# Patient Record
Sex: FEMALE | Race: ASIAN | URBAN - METROPOLITAN AREA
[De-identification: names, ages, dates, MRNs, and addresses within clinical notes are randomized per-mention and may not be internally consistent; named-entity substitution may affect disease eponyms.]

---

## 2019-03-19 ENCOUNTER — NURSE ONLY (OUTPATIENT)
Dept: PRIMARY CARE CLINIC | Age: 19
End: 2019-03-19

## 2019-03-19 DIAGNOSIS — Z23 NEED FOR TUBERCULOSIS VACCINATION: Primary | ICD-10-CM

## 2019-03-19 PROCEDURE — 86580 TB INTRADERMAL TEST: CPT | Performed by: FAMILY MEDICINE

## 2019-03-21 ENCOUNTER — NURSE ONLY (OUTPATIENT)
Dept: PRIMARY CARE CLINIC | Age: 19
End: 2019-03-21

## 2019-03-21 LAB
INDURATION: NORMAL
TB SKIN TEST: NEGATIVE

## 2019-03-22 ENCOUNTER — HOSPITAL ENCOUNTER (OUTPATIENT)
Age: 19
Discharge: HOME | End: 2019-03-24
Payer: COMMERCIAL

## 2019-03-22 ENCOUNTER — OFFICE VISIT (OUTPATIENT)
Dept: PRIMARY CARE CLINIC | Age: 19
End: 2019-03-22

## 2019-03-22 VITALS
TEMPERATURE: 98.3 F | BODY MASS INDEX: 20.22 KG/M2 | OXYGEN SATURATION: 99 % | HEIGHT: 60 IN | WEIGHT: 103 LBS | HEART RATE: 63 BPM | DIASTOLIC BLOOD PRESSURE: 60 MMHG | RESPIRATION RATE: 17 BRPM | SYSTOLIC BLOOD PRESSURE: 102 MMHG

## 2019-03-22 DIAGNOSIS — Z20.2 EXPOSURE TO SEXUALLY TRANSMITTED DISEASE (STD): ICD-10-CM

## 2019-03-22 DIAGNOSIS — N93.8 DYSFUNCTIONAL UTERINE BLEEDING: ICD-10-CM

## 2019-03-22 LAB
CONTROL: NORMAL
PREGNANCY TEST URINE, POC: NORMAL

## 2019-03-22 PROCEDURE — 99202 OFFICE O/P NEW SF 15 MIN: CPT | Performed by: FAMILY MEDICINE

## 2019-03-22 PROCEDURE — 87491 CHLMYD TRACH DNA AMP PROBE: CPT

## 2019-03-22 PROCEDURE — 81025 URINE PREGNANCY TEST: CPT | Performed by: FAMILY MEDICINE

## 2019-03-22 PROCEDURE — 87591 N.GONORRHOEAE DNA AMP PROB: CPT

## 2019-03-22 RX ORDER — DOXYCYCLINE HYCLATE 100 MG
100 TABLET ORAL 2 TIMES DAILY
Qty: 14 TABLET | Refills: 0 | Status: SHIPPED | OUTPATIENT
Start: 2019-03-22 | End: 2019-03-29

## 2019-03-22 SDOH — HEALTH STABILITY: MENTAL HEALTH: HOW OFTEN DO YOU HAVE A DRINK CONTAINING ALCOHOL?: 2-4 TIMES A MONTH

## 2019-03-22 ASSESSMENT — PATIENT HEALTH QUESTIONNAIRE - PHQ9
SUM OF ALL RESPONSES TO PHQ9 QUESTIONS 1 & 2: 0
2. FEELING DOWN, DEPRESSED OR HOPELESS: 0
SUM OF ALL RESPONSES TO PHQ QUESTIONS 1-9: 0
1. LITTLE INTEREST OR PLEASURE IN DOING THINGS: 0
SUM OF ALL RESPONSES TO PHQ QUESTIONS 1-9: 0

## 2019-03-27 ENCOUNTER — TELEPHONE (OUTPATIENT)
Dept: FAMILY MEDICINE CLINIC | Age: 19
End: 2019-03-27

## 2019-03-27 LAB
N GONORRHOEAE AMPLIFIED DET: ABNORMAL
ORGANISM: ABNORMAL

## 2019-04-01 ENCOUNTER — NURSE ONLY (OUTPATIENT)
Dept: PRIMARY CARE CLINIC | Age: 19
End: 2019-04-01

## 2019-04-01 NOTE — PATIENT INSTRUCTIONS
Reviewed patients positive Chlamydia results per Dr. Latanya Elmore. Discussed importance of taking the prescribed antibiotics as instructed and scheduling a follow up visit in 1 month, patient verbalized an understanding.

## 2019-04-17 ENCOUNTER — OFFICE VISIT (OUTPATIENT)
Dept: PRIMARY CARE CLINIC | Age: 19
End: 2019-04-17

## 2019-04-17 VITALS
TEMPERATURE: 98.7 F | DIASTOLIC BLOOD PRESSURE: 62 MMHG | RESPIRATION RATE: 15 BRPM | OXYGEN SATURATION: 99 % | HEART RATE: 67 BPM | BODY MASS INDEX: 19.45 KG/M2 | WEIGHT: 103 LBS | HEIGHT: 61 IN | SYSTOLIC BLOOD PRESSURE: 97 MMHG

## 2019-04-17 DIAGNOSIS — K14.6 TONGUE PAIN: ICD-10-CM

## 2019-04-17 DIAGNOSIS — J02.9 PHARYNGITIS, UNSPECIFIED ETIOLOGY: Primary | ICD-10-CM

## 2019-04-17 LAB — S PYO AG THROAT QL: NORMAL

## 2019-04-17 PROCEDURE — 99213 OFFICE O/P EST LOW 20 MIN: CPT | Performed by: NURSE PRACTITIONER

## 2019-04-17 PROCEDURE — 87880 STREP A ASSAY W/OPTIC: CPT | Performed by: NURSE PRACTITIONER

## 2019-04-17 RX ORDER — AMOXICILLIN 500 MG/1
500 CAPSULE ORAL 2 TIMES DAILY
Qty: 20 CAPSULE | Refills: 0 | Status: SHIPPED | OUTPATIENT
Start: 2019-04-17 | End: 2019-04-27

## 2019-04-17 NOTE — PROGRESS NOTES
Chief Complaint:   Pharyngitis (Sore throat 3-4xdays )      History of Present Illness   Source of history provided by:  patient. Traci Mckinney is a 19 y.o. old female who has a past medical history of: No past medical history on file. presents to the [facility] for sore throat x 3 days. She also reports that her tongue hurts as well. Also has mild headache. Reports associated pain with swallowing, nasal congestion, rhinorrhea, body aches, and nausea. Denies any fever, chills, dyspnea, dysphagia, CP, SOB, cough, nausea, vomiting, rash, or lethargy. She has not taken any medication for her sore throat. Exposed To: Streptococcus: no.                              Infectious Mononucleosis:  no.        ROS    Unless otherwise stated in this report or unable to obtain because of the patient's clinical or mental status as evidenced by the medical record, this patients's positive and negative responses for Review of Systems, constitutional, psych, eyes, ENT, cardiovascular, respiratory, gastrointestinal, neurological, genitourinary, musculoskeletal, integument systems and systems related to the presenting problem are either stated in the preceding or were not pertinent or were negative for the symptoms and/or complaints related to the medical problem. Past Medical History:  has no past medical history on file. Past Surgical History:  has no past surgical history on file. Social History:  reports that she has never smoked. She has never used smokeless tobacco. She reports that she drinks alcohol. She reports that she does not use drugs. Family History: family history is not on file. Allergies: Patient has no known allergies.     Physical Exam         VS:  BP 97/62 (Site: Right Upper Arm, Position: Sitting)   Pulse 67   Temp 98.7 °F (37.1 °C) (Oral)   Resp 15   Ht 5' 1\" (1.549 m)   Wt 103 lb (46.7 kg)   LMP 03/25/2019 (Exact Date)   SpO2 99%   BMI 19.46 kg/m²    Oxygen Saturation Interpretation: Normal     Constitutional:  Alert, development consistent with age. .  Ears:  TMs  without perforation, injection, or bulging. External canals clear without swelling or exudate. Throat: Airway patent. Posterior pharynx with moderate erythema and moderate tonsillar hypertrophy. Purulent exudate noted. Tongue is midline - no erythema or edema noted. Neck:  Supple with full ROM. There is mild anterior bilateral adenopathy. Lungs:  Clear to auscultation and breath sounds equal.    CV: Regular rate and rhythm, normal heart sounds, without pathological murmurs, ectopy, gallops, or rubs. Skin:  No rashes, erythema present. Lymphatics: No lymphangitis or adenopathy noted other then stated above. Neurological:  Alert and orientated. Motor functions intact. Responds to commands. Lab / Imaging Results   (All laboratory and radiology results have been personally reviewed by myself)  Labs:  Results for orders placed or performed in visit on 04/17/19   POCT rapid strep A   Result Value Ref Range    Strep A Ag None Detected None Detected       Imaging: All Radiology results interpreted by Radiologist unless otherwise noted. No orders to display       Assessment / Plan     Impression(s):    1. Pharyngitis, unspecified etiology    - POCT rapid strep A  - amoxicillin (AMOXIL) 500 MG capsule; Take 1 capsule by mouth 2 times daily for 10 days  Dispense: 20 capsule; Refill: 0    2. Tongue pain    - Magic Mouthwash (MIRACLE MOUTHWASH); Swish and spit 5 mLs 4 times daily as needed for Irritation  Dispense: 240 mL; Refill: 0    Return if symptoms worsen or fail to improve. . Script written for amoxil, side effects discussed. Increase fluids and rest. NSAIDs prn pain/fever. F/u PCP in 5-7 days if symptoms persist. ED sooner if symptoms worsen or change. ED immediately with the development of refractory fever, shaking chills, dyspnea, dysphagia, neck stiffness, vomiting, etc. Pt is in agreement with this care plan. All questions answered.

## 2019-05-10 ENCOUNTER — HOSPITAL ENCOUNTER (OUTPATIENT)
Age: 19
Discharge: HOME | End: 2019-05-12
Payer: COMMERCIAL

## 2019-05-10 ENCOUNTER — NURSE ONLY (OUTPATIENT)
Dept: PRIMARY CARE CLINIC | Age: 19
End: 2019-05-10

## 2019-05-10 DIAGNOSIS — Z20.2 EXPOSURE TO SEXUALLY TRANSMITTED DISEASE (STD): Primary | ICD-10-CM

## 2019-05-10 DIAGNOSIS — Z20.2 EXPOSURE TO SEXUALLY TRANSMITTED DISEASE (STD): ICD-10-CM

## 2019-05-10 PROCEDURE — 87591 N.GONORRHOEAE DNA AMP PROB: CPT

## 2019-05-10 PROCEDURE — 87491 CHLMYD TRACH DNA AMP PROBE: CPT

## 2019-05-16 LAB
CHLAMYDIA TRACHOMATIS AMPLIFIED DET: NORMAL
N GONORRHOEAE AMPLIFIED DET: NORMAL

## 2019-06-07 ENCOUNTER — HOSPITAL ENCOUNTER (OUTPATIENT)
Age: 19
Discharge: HOME | End: 2019-06-09
Payer: COMMERCIAL

## 2019-06-07 ENCOUNTER — OFFICE VISIT (OUTPATIENT)
Dept: PRIMARY CARE CLINIC | Age: 19
End: 2019-06-07

## 2019-06-07 VITALS
WEIGHT: 104 LBS | SYSTOLIC BLOOD PRESSURE: 102 MMHG | DIASTOLIC BLOOD PRESSURE: 68 MMHG | HEART RATE: 63 BPM | OXYGEN SATURATION: 98 % | BODY MASS INDEX: 20.42 KG/M2 | HEIGHT: 60 IN

## 2019-06-07 DIAGNOSIS — Z20.2 EXPOSURE TO SEXUALLY TRANSMITTED DISEASE (STD): Primary | ICD-10-CM

## 2019-06-07 DIAGNOSIS — Z20.2 EXPOSURE TO SEXUALLY TRANSMITTED DISEASE (STD): ICD-10-CM

## 2019-06-07 PROCEDURE — 87491 CHLMYD TRACH DNA AMP PROBE: CPT

## 2019-06-07 PROCEDURE — 99213 OFFICE O/P EST LOW 20 MIN: CPT | Performed by: FAMILY MEDICINE

## 2019-06-07 PROCEDURE — 86703 HIV-1/HIV-2 1 RESULT ANTBDY: CPT

## 2019-06-07 PROCEDURE — 87591 N.GONORRHOEAE DNA AMP PROB: CPT

## 2019-06-07 ASSESSMENT — ENCOUNTER SYMPTOMS: ABDOMINAL PAIN: 0

## 2019-06-07 NOTE — PROGRESS NOTES
[facility]  Department of Family Medicine  Family Medicine Residency Program      Patient:  Samanta Villela 19 y.o. female     Date of Service: 6/7/19      Chief complaint:   Chief Complaint   Patient presents with    Other     std checking. pos Chlamydia in march and neg retest in may. Pt concerned         History of Present Illness   The patient is a 19 y.o. female  presented to the clinic with complaints as above. She is worried that she got chlamydia again. She is with another person and he is also positive. She does not use condoms every time. She is not feeling depressed and is keeping contact with her family. No problems with urination, no pain, no frequency. No noticed any vaginal discharge. No pain with intercourse, no bleeding. Past Medical History:  History reviewed. No pertinent past medical history. Past Surgical History:    History reviewed. No pertinent surgical history. Allergies:    Patient has no known allergies.     Social History:   Social History     Socioeconomic History    Marital status: Single     Spouse name: Not on file    Number of children: Not on file    Years of education: Not on file    Highest education level: Not on file   Occupational History    Occupation: student   Social Needs    Financial resource strain: Not on file    Food insecurity:     Worry: Not on file     Inability: Not on file    Transportation needs:     Medical: Not on file     Non-medical: Not on file   Tobacco Use    Smoking status: Never Smoker    Smokeless tobacco: Never Used   Substance and Sexual Activity    Alcohol use: Yes     Frequency: 2-4 times a month    Drug use: Never    Sexual activity: Yes     Partners: Male     Birth control/protection: Condom   Lifestyle    Physical activity:     Days per week: Not on file     Minutes per session: Not on file    Stress: Not on file   Relationships    Social connections:     Talks on phone: Not on file     Gets together: Not on file     Attends Taoist service: Not on file     Active member of club or organization: Not on file     Attends meetings of clubs or organizations: Not on file     Relationship status: Not on file    Intimate partner violence:     Fear of current or ex partner: Not on file     Emotionally abused: Not on file     Physically abused: Not on file     Forced sexual activity: Not on file   Other Topics Concern    Not on file   Social History Narrative    Not on file        Family History:   History reviewed. No pertinent family history. Review of Systems:   Review of Systems   Gastrointestinal: Negative for abdominal pain. Genitourinary: Negative for decreased urine volume, difficulty urinating, dysuria, menstrual problem, urgency, vaginal discharge and vaginal pain. Physical Exam   Vitals: /68 (Site: Left Upper Arm, Position: Sitting)   Pulse 63   Ht 5' (1.524 m)   Wt 104 lb (47.2 kg)   LMP 05/23/2019 (Exact Date)   SpO2 98%   BMI 20.31 kg/m²   Physical Exam   Constitutional: She is oriented to person, place, and time. She appears well-developed and well-nourished. Neck: Neck supple. Cardiovascular: Normal rate, regular rhythm and normal heart sounds. Exam reveals no gallop and no friction rub. No murmur heard. Pulmonary/Chest: Breath sounds normal. No stridor. No respiratory distress. She has no wheezes. She has no rales. Abdominal: Soft. Bowel sounds are normal. There is no tenderness. Neurological: She is alert and oriented to person, place, and time. Skin: Skin is warm and dry. Psychiatric: She has a normal mood and affect. Her behavior is normal. Judgment and thought content normal.         Assessment and Plan       1. Exposure to sexually transmitted disease (STD)  Pt with exposure to STI. Will check for GC, Chlamydia and HIV. Will call pt and treat if positive and recheck if positive in 1 mn.  - NEISSERIA GONORRHOEAE DNA, Urine;  Future  - CHLAMYDIA

## 2019-06-07 NOTE — PATIENT INSTRUCTIONS
Patient Education        Learning About Birth Control: Condoms  What are condoms? Condoms can be used to prevent pregnancy. They also help protect against sexually transmitted infections (STIs). Condoms are called a barrier method of birth control. That's because they keep the sperm and eggs apart. The condom holds the sperm so the sperm can't get into the vagina. You must use a new condom each time you have intercourse. Male condoms are made of latex (rubber), polyurethane, or sheep intestine. They are placed over a hard (erect) penis before intercourse. Male condoms are also called \"rubbers,\" \"sheaths,\" or \"skins. \"  There are many different kinds of male condoms. Some condoms are lubricated. Some are ribbed. Most have a tip for holding the semen. You can also buy condoms of different sizes. Female condoms are tubes of soft plastic with a closed end. Each end has a ring or rim. The ring at the closed end is put deep into the vagina over the cervix. This holds the tube in place. The ring at the open end stays outside the opening of the vagina. Female condoms have lubricant on the inside. How well do they work? In the first year of use:  · When male condoms are used exactly as directed, 2 women out of 100 have an unplanned pregnancy. When they are not used exactly as directed, 25 women out of 100 have an unplanned pregnancy. Male condoms work best when you use another type of birth control, such as a diaphragm with spermicide or an IUD, along with them. · When female condoms are used exactly as directed, 5 women out of 100 have an unplanned pregnancy. When they are not used exactly as directed, 21 women out of 100 have an unplanned pregnancy. Be sure to tell your doctor about any health problems you have or medicines you take. He or she can help you choose the birth control method that is right for you. What are the advantages of condoms? · Condoms may be available for free at family planning clinics. You can buy them without a prescription at clinics, in drugstores, online, and in some grocery stores. · Female condoms and rubber and plastic male condoms help protect against STIs, such as herpes or HIV. Sheep intestine condoms don't protect against STIs. · Condoms don't use hormones. So you can use condoms if you don't want to take hormones or can't take hormones because you have certain health problems or concerns. · Condoms are safe to use while breastfeeding. · They cost less than hormonal types of birth control. · Female condoms can be inserted up to 8 hours ahead of time. You don't have to interrupt sex. What are the disadvantages of condoms? · Condoms don't prevent pregnancy as well as IUDs or hormonal forms of birth control. · Condoms prevent pregnancy only if you use them every time you have intercourse. · Condoms may break or leak. · You may have to interrupt sex to put on or insert the condom. · You must remove the condom right after intercourse. · You may have less sexual sensation when using a condom. If you think you used a condom incorrectly, you can use emergency contraception. One example is the morning-after pill (Plan B). You can use emergency contraception for up to 5 days after you had sex, but it works best if you take it right away. Where can you learn more? Go to https://[employer]booker.com.[email or web address]. org and sign in to your [employer] account. Enter W706 in the [facility] box to learn more about \"Learning About Birth Control: Condoms. \"     If you do not have an account, please click on the \"Sign Up Now\" link. Current as of: September 5, 2018  Content Version: 12.0  © 7955-0301 Healthwise, Incorporated. Care instructions adapted under license by [facility] ([facility]). If you have questions about a medical condition or this instruction, always ask your healthcare professional. [address] any warranty or liability for your use of this information.

## 2019-06-10 LAB — HIV-1 AND HIV-2 ANTIBODIES: NORMAL

## 2019-06-13 LAB
C. TRACHOMATIS DNA ,URINE: NEGATIVE
N. GONORRHOEAE DNA, URINE: NEGATIVE
SOURCE: NORMAL

## 2019-07-02 ENCOUNTER — NURSE ONLY (OUTPATIENT)
Dept: PRIMARY CARE CLINIC | Age: 19
End: 2019-07-02

## 2019-07-02 DIAGNOSIS — Z23 NEED FOR MMR VACCINE: ICD-10-CM

## 2019-07-02 PROCEDURE — 90471 IMMUNIZATION ADMIN: CPT | Performed by: FAMILY MEDICINE

## 2019-07-02 PROCEDURE — 90707 MMR VACCINE SC: CPT | Performed by: FAMILY MEDICINE

## 2019-07-02 NOTE — PATIENT INSTRUCTIONS
Patient Education        MMR Vaccine: Care Instructions  Your Care Instructions    An MMR vaccine protects against measles, mumps, and rubella. These diseases used to be common in children before the vaccine. Children get two doses of MMR. They get the first dose when they are 12 to 17 months old and the second dose at 3to 10years old. Be sure your child gets this second shot no later than age 15. These shots will prevent measles, mumps, and rubella for life. But if your community has had a recent mumps outbreak, a third dose of the MMR is recommended. The MMR vaccine may include the vaccine to protect against chickenpox (varicella) and is called the MMRV vaccine. Sometimes doctors recommend the MMR vaccine for a child younger than 1 year if there is a measles outbreak. The vaccine also may be given to babies who will travel outside the [facility]. An MMR vaccine given before age 3 must be repeated when the child is older than 1. A child who had a bad reaction to an MMR shot should not get another one. Be sure to tell your doctor if your child ever had a seizure or trouble breathing after a vaccination. Some parents worry that the MMR vaccine causes autism in children. Many studies have been done, and no link has been found between MMR and autism. Adults born after 26 who have not had the MMR vaccine should get at least one dose if they do not have evidence of immunity. Women who have not had the MMR vaccine should get it at least 4 weeks before trying to get pregnant. Rubella during pregnancy can cause birth defects. If you are pregnant, you cannot get the vaccine until after your pregnancy is over. Follow-up care is a key part of your treatment and safety. Be sure to make and go to all appointments, and call your doctor if you are having problems. It's also a good idea to know your test results and keep a list of the medicines you take. How can you care for yourself at home?   · Give acetaminophen

## 2019-09-24 ENCOUNTER — OFFICE VISIT (OUTPATIENT)
Dept: PRIMARY CARE CLINIC | Age: 19
End: 2019-09-24

## 2019-09-24 ENCOUNTER — HOSPITAL ENCOUNTER (OUTPATIENT)
Age: 19
Discharge: HOME | End: 2019-09-26
Payer: COMMERCIAL

## 2019-09-24 VITALS
DIASTOLIC BLOOD PRESSURE: 70 MMHG | HEIGHT: 60 IN | OXYGEN SATURATION: 99 % | BODY MASS INDEX: 19.83 KG/M2 | SYSTOLIC BLOOD PRESSURE: 102 MMHG | WEIGHT: 101 LBS | HEART RATE: 69 BPM

## 2019-09-24 DIAGNOSIS — R81 GLUCOSURIA: ICD-10-CM

## 2019-09-24 DIAGNOSIS — R30.0 DYSURIA: ICD-10-CM

## 2019-09-24 DIAGNOSIS — Z72.51 HIGH RISK SEXUAL BEHAVIOR, UNSPECIFIED TYPE: ICD-10-CM

## 2019-09-24 DIAGNOSIS — R30.0 DYSURIA: Primary | ICD-10-CM

## 2019-09-24 LAB
BILIRUBIN, POC: ABNORMAL
BLOOD URINE, POC: ABNORMAL
CHP ED QC CHECK: NORMAL
CLARITY, POC: CLEAR
COLOR, POC: YELLOW
CONTROL: NORMAL
GLUCOSE BLD-MCNC: 155 MG/DL
GLUCOSE URINE, POC: ABNORMAL
HBA1C MFR BLD: 5.2 %
KETONES, POC: ABNORMAL
LEUKOCYTE EST, POC: ABNORMAL
NITRITE, POC: ABNORMAL
PH, POC: 6
PREGNANCY TEST URINE, POC: NORMAL
PROTEIN, POC: ABNORMAL
SPECIFIC GRAVITY, POC: 1.02
UROBILINOGEN, POC: 0.2

## 2019-09-24 PROCEDURE — 82962 GLUCOSE BLOOD TEST: CPT | Performed by: NURSE PRACTITIONER

## 2019-09-24 PROCEDURE — 87491 CHLMYD TRACH DNA AMP PROBE: CPT

## 2019-09-24 PROCEDURE — 83036 HEMOGLOBIN GLYCOSYLATED A1C: CPT | Performed by: NURSE PRACTITIONER

## 2019-09-24 PROCEDURE — 81002 URINALYSIS NONAUTO W/O SCOPE: CPT | Performed by: NURSE PRACTITIONER

## 2019-09-24 PROCEDURE — 99213 OFFICE O/P EST LOW 20 MIN: CPT | Performed by: NURSE PRACTITIONER

## 2019-09-24 PROCEDURE — 87210 SMEAR WET MOUNT SALINE/INK: CPT

## 2019-09-24 PROCEDURE — 81025 URINE PREGNANCY TEST: CPT | Performed by: NURSE PRACTITIONER

## 2019-09-24 PROCEDURE — 87591 N.GONORRHOEAE DNA AMP PROB: CPT

## 2019-09-24 NOTE — PROGRESS NOTES
rhonchi. Heart:  RRR without pathologic murmurs, rubs, or gallops. Abdomen: Soft, nondistended, with mild suprapubic tenderness. No rebound, rigidity, or guarding. BS+ X4. No organomegaly. Back: Denies CVA tenderness. Skin:  Normal turgor. Warm, dry, without visible rash, unless noted elsewhere. Neurological:  Alert and oriented. Motor functions intact. Responds to verbal commands. Lab / Imaging Results   (All laboratory and radiology results have been personally reviewed by myself)  Labs:  No results found for this visit on 09/24/19. Imaging: All Radiology results interpreted by Radiologist unless otherwise noted. No orders to display       Medical Decision Making:    Patient is well appearing, non toxic and appropriate for outpatient management. Plan is for symptom management and PCP follow up. Assessment / Plan     1. Dysuria    - POCT Urinalysis no Micro  - POCT urine pregnancy  - C.TRACHOMATIS [place] Leny; Future    2. Glucosuria  Patient denies hx of diabetes, family hx.   - POCT Glucose  - POCT glycosylated hemoglobin (Hb A1C)    3. High risk sexual behavior, unspecified type    - C.TRACHOMATIS [place] Jimenesider; Future  - TRICHOMONAS SCREEN; Future    Return if symptoms worsen or fail to improve. UA appears negatice for a UTI. Urine C&S pending, will call with results once available. Script written for as above, side effects discussed. Increase fluids and rest. F/u PCP in 3-5 days if symptoms persist. ED sooner if symptoms worsen or change. ED immediately with the development of fever, shaking chills, body aches, flank pain, vomiting, CP, or SOB. Pt is in agreement with this care plan. All questions answered.

## 2019-09-27 LAB
C. TRACHOMATIS DNA ,URINE: NEGATIVE
N. GONORRHOEAE DNA, URINE: NEGATIVE
SOURCE: NORMAL

## 2019-09-29 LAB — CULTURE, TRICHOMONAS: NORMAL
